# Patient Record
Sex: MALE | Race: WHITE | NOT HISPANIC OR LATINO | URBAN - METROPOLITAN AREA
[De-identification: names, ages, dates, MRNs, and addresses within clinical notes are randomized per-mention and may not be internally consistent; named-entity substitution may affect disease eponyms.]

---

## 2022-11-12 ENCOUNTER — EMERGENCY (EMERGENCY)
Facility: HOSPITAL | Age: 32
LOS: 0 days | Discharge: HOME | End: 2022-11-12
Attending: EMERGENCY MEDICINE | Admitting: EMERGENCY MEDICINE

## 2022-11-12 VITALS
HEART RATE: 66 BPM | RESPIRATION RATE: 16 BRPM | DIASTOLIC BLOOD PRESSURE: 80 MMHG | SYSTOLIC BLOOD PRESSURE: 143 MMHG | TEMPERATURE: 97 F | WEIGHT: 199.96 LBS | OXYGEN SATURATION: 98 %
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
## 2022-11-12 DIAGNOSIS — M79.641 PAIN IN RIGHT HAND: ICD-10-CM

## 2022-11-12 DIAGNOSIS — S62.390A OTHER FRACTURE OF SECOND METACARPAL BONE, RIGHT HAND, INITIAL ENCOUNTER FOR CLOSED FRACTURE: ICD-10-CM
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
## 2022-11-12 DIAGNOSIS — S62.324A DISPLACED FRACTURE OF SHAFT OF FOURTH METACARPAL BONE, RIGHT HAND, INITIAL ENCOUNTER FOR CLOSED FRACTURE: ICD-10-CM

## 2022-11-12 DIAGNOSIS — S62.322A DISPLACED FRACTURE OF SHAFT OF THIRD METACARPAL BONE, RIGHT HAND, INITIAL ENCOUNTER FOR CLOSED FRACTURE: ICD-10-CM

## 2022-11-12 PROCEDURE — 99284 EMERGENCY DEPT VISIT MOD MDM: CPT

## 2022-11-12 PROCEDURE — 73130 X-RAY EXAM OF HAND: CPT | Mod: 26,RT

## 2022-11-12 RX ORDER — MORPHINE SULFATE 50 MG/1
6 CAPSULE, EXTENDED RELEASE ORAL ONCE
Refills: 0 | Status: DISCONTINUED | OUTPATIENT
Start: 2022-11-12 | End: 2022-11-12

## 2022-11-12 RX ORDER — IBUPROFEN 200 MG
1 TABLET ORAL
Qty: 27 | Refills: 0
Start: 2022-11-12 | End: 2022-11-20

## 2022-11-12 RX ADMIN — MORPHINE SULFATE 6 MILLIGRAM(S): 50 CAPSULE, EXTENDED RELEASE ORAL at 12:03

## 2022-11-12 NOTE — ED PROVIDER NOTE - CLINICAL SUMMARY MEDICAL DECISION MAKING FREE TEXT BOX
Pt presents with trauma to the right hand. Fracture of the 2,3,4 th metacarpals. Reduction attempted by ortho> mild improvement. Pt will need surgery. Pt reports he has not insurance but will be able to pay for services. Pt must report to ortho this week.

## 2022-11-12 NOTE — ED ADULT NURSE NOTE - OBJECTIVE STATEMENT
Pt c/o right hand pain after injury at the gym at 9am today after 65-pounds weight fell on right hand. Reported numbness in right hand that radiates to right elbow. As per pt, he was given tylenol at gym but reports pain still 8/10. Denies any fever, chills or night sweats.

## 2022-11-12 NOTE — ED PROVIDER NOTE - OBJECTIVE STATEMENT
Pt was in gym, lifting wts. 65 pound wt fell onto the right hand by the right middle knuckle. Associated swelling and pain. Increasing pain. EMS placed into splint. Pain on movement.

## 2022-11-12 NOTE — ED ADULT NURSE NOTE - NSIMPLEMENTINTERV_GEN_ALL_ED
Implemented All Universal Safety Interventions:  [place] to call system. Call bell, personal items and telephone within reach. Instruct patient to call for assistance. Room bathroom lighting operational. Non-slip footwear when patient is off stretcher. Physically safe environment: no spills, clutter or unnecessary equipment. Stretcher in lowest position, wheels locked, appropriate side rails in place.

## 2022-11-12 NOTE — ED PROVIDER NOTE - PATIENT PORTAL LINK FT
You can access the FollowMyHealth Patient Portal offered by [facility] by registering at the following website: [email or web address]. By joining [facility]’s FollowMyHealth portal, you will also be able to view your health information using other applications (apps) compatible with our system.

## 2022-11-12 NOTE — CONSULT NOTE ADULT - SUBJECTIVE AND OBJECTIVE BOX
Orthopaedic Surgery Consult Note    For Surgeon:    HPI:  32yMale presenting to the ER today with right hand pain after injury in the gym. pt was lifting weights this morning when a 65lb weight fell directly onto his right hand. admits to immediate pain and swelling. took advil in the gym with no resolution of pain. Pt is right handed. Denies pain elsewhere, admits to feeling pins and needles in the hand.       Allergies  No Known Allergies    Intolerances      PAST MEDICAL & SURGICAL HISTORY:  No pertinent past medical history    MEDICATIONS  (STANDING):    MEDICATIONS  (PRN):      Vital Signs Last 24 Hrs  T(C): 36.1 (12 Nov 2022 09:31), Max: 36.1 (12 Nov 2022 09:31)  T(F): 97 (12 Nov 2022 09:31), Max: 97 (12 Nov 2022 09:31)  HR: 66 (12 Nov 2022 09:31) (66 - 66)  BP: 143/80 (12 Nov 2022 09:31) (143/80 - 143/80)  BP(mean): --  RR: 16 (12 Nov 2022 09:31) (16 - 16)  SpO2: 98% (12 Nov 2022 09:31) (98% - 98%)    Parameters below as of 12 Nov 2022 09:31  Patient On (Oxygen Delivery Method): room air      Physical Exam:  Patient sitting in ER in NAD    RUE  +swollen hand, skin intact no open wounds  +ttp over 2nd 3rd and 4th metacarpals   no ecchymosis  compartments soft and compressible  SILT Ax/LABCN/R/M/U  AIN/PIN/Ulnar intact;  Firing deltoid/biceps/triceps  Hand WWP, 2+ radial pulse  good cap refill  ROM limited of metacarpals due to pain     Imaging: right hand 3rd and 4th metacarpal shaft fracture dislocations, 2nd metacarpal base fracture dislocation    A/P: 32yMale  -splinted in ED; minimal reduction attempted  -discussed with patient that he will require surgery for this injury; patient does not have insurance at this time; discussed his options at this time 1. he is awaiting his insurance plan to kick in; if this happens in the next 2 weeks he can f/u with  at [address] [facility]; [phone] 2. If insurance does not kick in he should go to [facility] with freee clinic ie. [facility], [facility] 3. he is a  so he can follow up at the [facility] for surgical care  -splint care discussed with patient to keep clean dry and intact; discussed that if patient has excruitiating pain uncontrolled by medications or feelings of numbness/paresthesias to the hand he should return to the ER  -discussed patient should keep right upper extremity elevated to help reduce swelling; use of NSAIDs fro swelling  -pain meds on d/c  -NWB Right hand   -Discussed with Dr. vanegas

## 2022-11-12 NOTE — ED PROVIDER NOTE - NSFOLLOWUPINSTRUCTIONS_ED_ALL_ED_FT
You must follow up with orthopedics this week  Must return for increased pain.  No wt lifting right hand  ICE 4-5 x daily 15 minutes at a time.   Keep elevated as much as possible.     Take Motrin 600 mg three times daily    Take oxycodone as need for more severe pain    Keep splint on.    You may not drive while taking oxycodone.

## 2022-11-12 NOTE — ED PROVIDER NOTE - CARE PROVIDER_API CALL
Jj Waggoner)  Orthopaedic Surgery  [address] [place] [address]  Phone: [phone]  Fax: [phone]  Follow Up Time: 1-3 Days

## 2022-11-12 NOTE — ED PROVIDER NOTE - PHYSICAL EXAMINATION
right hand is swollen. NO laceration. pain on range of motion which is limited. NO obvious dislocation.

## 2022-11-12 NOTE — ED PROVIDER NOTE - PROGRESS NOTE DETAILS
Ortho resident, Deepthi advised. Will see pt. Spoke to ortho. Will reduce. Morphine IV prior to reduction. Kiki Tam | Reference #: [id number] ISTOP search.

## 2022-11-15 ENCOUNTER — APPOINTMENT (OUTPATIENT)
Dept: ORTHOPEDIC SURGERY | Facility: CLINIC | Age: 32
End: 2022-11-15

## 2022-11-15 VITALS — WEIGHT: 192 LBS | BODY MASS INDEX: 26.01 KG/M2 | HEIGHT: 72 IN

## 2022-11-15 PROBLEM — Z78.9 OTHER SPECIFIED HEALTH STATUS: Chronic | Status: ACTIVE | Noted: 2022-11-12

## 2022-11-15 PROBLEM — Z00.00 ENCOUNTER FOR PREVENTIVE HEALTH EXAMINATION: Status: ACTIVE | Noted: 2022-11-15

## 2022-11-15 PROCEDURE — 99203 OFFICE O/P NEW LOW 30 MIN: CPT

## 2022-11-15 NOTE — DISCUSSION/SUMMARY
[de-identified] :  patient is suffering from fractures of the 2nd 3rd and 4th  Metacarpal shaft of the right hand. It is recommended that the patient will need surgery to repair the this injury. Pt was instructed to wear the splint at all times and to continue elevating his right arm and maintaining full motion of his fingers. He can continue taking the prescribed pain medications. Patient currently does not have insurance, so he does not want to book a procedure at this time. He will follow up in 1 week with Dr. Waggoner to discuss further plans for surgery.

## 2022-11-15 NOTE — HISTORY OF PRESENT ILLNESS
[de-identified] :  32-year-old male who is right-hand-dominant presents of the right hand pain.  On 11/12/2022 patient was weightlifting and dropped a 65 lb dumbbell onto his right hand.  Patient went to [facility] and was told he had broken bones in his hand based off x-rays.  He was put in a splint and was given ibuprofen 600 mg and Percocet.

## 2022-11-15 NOTE — DATA REVIEWED
[FreeTextEntry1] : X-ray images were obtained [facility] on 11/12/2022.  Images reveal Displaced fracture of the 2nd, 3rd and 4th metacarpal shaft.

## 2022-11-15 NOTE — PHYSICAL EXAM
[de-identified] : Physical exam of right hand limited since pt was left in splint until follow up appnt. \par \par Fingers of right hand were examined, fingers are mildly edematous and ecchymotic. Capillary refill <2 sec, full sensation and ROM

## 2022-11-21 ENCOUNTER — APPOINTMENT (OUTPATIENT)
Dept: ORTHOPEDIC SURGERY | Facility: CLINIC | Age: 32
End: 2022-11-21

## 2022-11-21 PROCEDURE — 99202 OFFICE O/P NEW SF 15 MIN: CPT

## 2022-11-21 NOTE — PHYSICAL EXAM
[de-identified] :  Patient is in a splint.  There is mild swelling present.  There is normal sensation normal capillary refill.  Good elbow range of motion.

## 2022-11-21 NOTE — HISTORY OF PRESENT ILLNESS
[de-identified] : 32-year-old male had a crushing injury from a weight while working at resulting in fracture to his right hand is affected his right index middle and ring metacarpal shaft with displaced fracture injury occurred about 10 days ago he has is splinted he currently is not working he works as an

## 2022-11-21 NOTE — ASSESSMENT
[FreeTextEntry1] :  Patient has displaced fractures index middle and ring metacarpal shaft indicated for open reduction internal fixation of the same the risks and benefits of the surgery discussed the patient postop course including splinting was discussed as the indications for the surgical intervention was discussed as well he will see us back time surgical intervention healing process return to work activities discussed with the patient his father.

## 2022-11-21 NOTE — DATA REVIEWED
[FreeTextEntry1] :   Radiographs from outside facility are reviewed documenting a middle and ring transverse metacarpal shaft fracture as well as a more nondisplaced but further proximal index finger metacarpal base fracture

## 2022-12-01 ENCOUNTER — APPOINTMENT (OUTPATIENT)
Dept: ORTHOPEDIC SURGERY | Facility: AMBULATORY SURGERY CENTER | Age: 32
End: 2022-12-01

## 2022-12-01 PROCEDURE — 26615 TREAT METACARPAL FRACTURE: CPT | Mod: F8

## 2022-12-01 RX ORDER — IBUPROFEN 800 MG/1
800 TABLET, FILM COATED ORAL 3 TIMES DAILY
Qty: 20 | Refills: 0 | Status: ACTIVE | COMMUNITY
Start: 2022-12-01 | End: 1900-01-01

## 2022-12-01 RX ORDER — OXYCODONE AND ACETAMINOPHEN 5; 325 MG/1; MG/1
5-325 TABLET ORAL
Qty: 20 | Refills: 0 | Status: ACTIVE | COMMUNITY
Start: 2022-12-01 | End: 1900-01-01

## 2022-12-13 ENCOUNTER — APPOINTMENT (OUTPATIENT)
Dept: ORTHOPEDIC SURGERY | Facility: CLINIC | Age: 32
End: 2022-12-13

## 2022-12-13 VITALS — BODY MASS INDEX: 26.01 KG/M2 | WEIGHT: 192 LBS | HEIGHT: 72 IN

## 2022-12-13 DIAGNOSIS — S62.324A DISPLACED FRACTURE OF SHAFT OF FOURTH METACARPAL BONE, RIGHT HAND, INITIAL ENCOUNTER FOR CLOSED FRACTURE: ICD-10-CM

## 2022-12-13 DIAGNOSIS — S62.322A DISPLACED FRACTURE OF SHAFT OF THIRD METACARPAL BONE, RIGHT HAND, INITIAL ENCOUNTER FOR CLOSED FRACTURE: ICD-10-CM

## 2022-12-13 DIAGNOSIS — S62.320A DISPLACED FRACTURE OF SHAFT OF SECOND METACARPAL BONE, RIGHT HAND, INITIAL ENCOUNTER FOR CLOSED FRACTURE: ICD-10-CM

## 2022-12-13 DIAGNOSIS — S62.329A DISPLACED FRACTURE OF SHAFT OF UNSPECIFIED METACARPAL BONE, INITIAL ENCOUNTER FOR CLOSED FRACTURE: ICD-10-CM

## 2022-12-13 PROCEDURE — 99024 POSTOP FOLLOW-UP VISIT: CPT

## 2022-12-13 PROCEDURE — 73130 X-RAY EXAM OF HAND: CPT | Mod: RT

## 2022-12-13 NOTE — DISCUSSION/SUMMARY
[de-identified] :   He will continue wearing the custom splint made by therapy.\par He will continue with occupational therapy.\par He will work on range of motion on his own as well.\par He will follow up in 3-4 weeks with Dr. Waggoner for repeat evaluation.\par Anticipated return to work is anywhere from 8-12 weeks.\par All questions were answered today.

## 2022-12-13 NOTE — DATA REVIEWED
[FreeTextEntry1] :   X-rays taken the office today of his right hand show the fractures in anatomic alignment in all postsurgical hardware intact.

## 2022-12-13 NOTE — HISTORY OF PRESENT ILLNESS
[de-identified] : Patient is a 32-year-old male here for his 1st postop appointment.  He is status post an ORIF of his right 2nd, 3rd, and 4th metacarpal shaft done by Dr. Waggoner.  He is doing well.

## 2022-12-13 NOTE — PHYSICAL EXAM
[de-identified] :   Physical exam of the right hand:  Mild swelling on the dorsal side of the hand.  Negative ecchymosis.  The wounds are clean and dry.  No signs of drainage, pus, or infection.  He can almost make a full fist.  He cannot fully extend his fingers.  His sensory and motor are intact.

## 2023-01-09 ENCOUNTER — APPOINTMENT (OUTPATIENT)
Dept: ORTHOPEDIC SURGERY | Facility: CLINIC | Age: 33
End: 2023-01-09

## 2023-02-07 ENCOUNTER — APPOINTMENT (OUTPATIENT)
Dept: ORTHOPEDIC SURGERY | Facility: CLINIC | Age: 33
End: 2023-02-07
Payer: COMMERCIAL

## 2023-02-07 VITALS — HEIGHT: 72 IN | WEIGHT: 192 LBS | BODY MASS INDEX: 26.01 KG/M2

## 2023-02-07 DIAGNOSIS — S62.324D DISPLACED FRACTURE OF SHAFT OF FOURTH METACARPAL BONE, RIGHT HAND, SUBSEQUENT ENCOUNTER FOR FRACTURE WITH ROUTINE HEALING: ICD-10-CM

## 2023-02-07 DIAGNOSIS — S62.322D DISPLACED FRACTURE OF SHAFT OF THIRD METACARPAL BONE, RIGHT HAND, SUBSEQUENT ENCOUNTER FOR FRACTURE WITH ROUTINE HEALING: ICD-10-CM

## 2023-02-07 DIAGNOSIS — S62.320D: ICD-10-CM

## 2023-02-07 PROCEDURE — 99024 POSTOP FOLLOW-UP VISIT: CPT

## 2023-02-07 PROCEDURE — 73130 X-RAY EXAM OF HAND: CPT | Mod: RT

## 2023-02-07 NOTE — PHYSICAL EXAM
[de-identified] : Patient has excellent range of motion of the right hand patient has well-healed surgical skin incisions the plate that is on the index finger metacarpal is prominent at the base.  No rotational abnormality normal sensation normal cap refill.

## 2023-02-07 NOTE — HISTORY OF PRESENT ILLNESS
[de-identified] : 32-year-old male status post internal fixation of multiple metacarpal fractures right hand.  Comes in today for evaluation about 2 months out from surgery.

## 2023-02-07 NOTE — PHYSICAL EXAM
[de-identified] : Patient has excellent range of motion of the right hand patient has well-healed surgical skin incisions the plate that is on the index finger metacarpal is prominent at the base.  No rotational abnormality normal sensation normal cap refill.

## 2023-02-07 NOTE — DATA REVIEWED
[FreeTextEntry1] : Radiographs 3 views of the right hand reviewed showing good position alignment and healing of right index middle and ring metacarpal shaft fractures with internal fixation in place

## 2023-02-07 NOTE — ASSESSMENT
[FreeTextEntry1] : Patient is healing his fractures well.  We will see me back on an as-needed basis more than likely sometime in the next several months we will need hardware removal.  He will contact us back when he wants that done time off of sports and aggressive activities I discussed once the plates are removed.

## 2023-02-07 NOTE — HISTORY OF PRESENT ILLNESS
[de-identified] : 32-year-old male status post internal fixation of multiple metacarpal fractures right hand.  Comes in today for evaluation about 2 months out from surgery.

## 2023-04-19 NOTE — ED ADULT NURSE NOTE - CHIEF COMPLAINT QUOTE
Pt came in to ED after 65-pounds weight fell on right hand at the gym 9am today. Reported numbness in right hand that radiates to right elbow. As per pt, he was given tylenol at gym but reports pain still 8/10. Denies any fever, chills or night sweats. Asmita Mcghee